# Patient Record
Sex: FEMALE | Race: OTHER | HISPANIC OR LATINO | URBAN - METROPOLITAN AREA
[De-identification: names, ages, dates, MRNs, and addresses within clinical notes are randomized per-mention and may not be internally consistent; named-entity substitution may affect disease eponyms.]

---
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
## 2021-04-15 ENCOUNTER — APPOINTMENT (OUTPATIENT)
Age: 21
End: 2021-04-15
Payer: MEDICAID

## 2021-04-15 PROCEDURE — 0001A: CPT

## 2021-05-06 ENCOUNTER — APPOINTMENT (OUTPATIENT)
Age: 21
End: 2021-05-06
Payer: MEDICAID

## 2021-05-06 PROCEDURE — 0002A: CPT

## 2021-07-01 ENCOUNTER — EMERGENCY (EMERGENCY)
Facility: HOSPITAL | Age: 21
LOS: 1 days | Discharge: HOME | End: 2021-07-01
Attending: EMERGENCY MEDICINE | Admitting: EMERGENCY MEDICINE
Payer: MEDICAID

## 2021-07-01 VITALS
HEIGHT: 62 IN | SYSTOLIC BLOOD PRESSURE: 105 MMHG | TEMPERATURE: 98 F | WEIGHT: 113.1 LBS | OXYGEN SATURATION: 100 % | RESPIRATION RATE: 16 BRPM | HEART RATE: 87 BPM | DIASTOLIC BLOOD PRESSURE: 64 MMHG

## 2021-07-01 VITALS
SYSTOLIC BLOOD PRESSURE: 110 MMHG | DIASTOLIC BLOOD PRESSURE: 65 MMHG | RESPIRATION RATE: 16 BRPM | OXYGEN SATURATION: 98 % | TEMPERATURE: 99 F | HEART RATE: 75 BPM

## 2021-07-01 PROCEDURE — 99283 EMERGENCY DEPT VISIT LOW MDM: CPT

## 2021-07-01 PROCEDURE — 99282 EMERGENCY DEPT VISIT SF MDM: CPT

## 2021-07-01 NOTE — ED PROVIDER NOTE - OBJECTIVE STATEMENT
20 yo female no pmhx, sexually active c/o vaginal itching on the lower outer part of her vagina since february 2021, gets worse after intercourse and after insert of tampon.  No medications used.  Feels itchy, not really painful.  No fever/chills.  Denies any abnormal discharge.  LMP 3 days ago.  Recently had STD panel performed that was negative.

## 2021-07-01 NOTE — ED PROVIDER NOTE - NSFOLLOWUPINSTRUCTIONS_ED_ALL_ED_FT
1) Follow-up with your GYN as scheduled Call today / next business day for prompt follow-up.  2) Return to Emergency room for any worsening or persistent pain, weakness, fever, or any other concerning symptoms.  3) See attached instruction sheets for additional information, including information regarding signs and symptoms to look out for, reasons to seek immediate care and other important instructions.  4) Bacitracin twice a day over affected area    A perineal tear is a cut or tear (laceration) in the tissue between the opening of the vagina and the anus (perineum). Some women develop a perineal tear during a vaginal birth. This can happen as the baby emerges from the birth canal and the perineum is stretched. There are four degrees of perineal tears based on how deep and long the laceration is:  •First degree. This involves a shallow tear at the edge of the vaginal opening that extends slightly into the perineal skin.      •Second degree. This involves tearing described in first degree perineal tear, and an additional deeper tear of the vaginal opening and perineal tissues. It may also include tearing of a muscle just under the perineal skin.      •Third degree. This involves tearing described in first and second degree perineal tears, with the addition that tearing in the third degree extends into the muscle of the anus (anal sphincter).      •Fourth degree. This involves all levels of tears described in first, second, and third degree perineal tears, with the tear in the fourth degree extending into the rectum.      First and second degree perineal tears may or may not be stitched closed, depending on their location and appearance. Third and fourth degree perineal tears are stitched closed immediately after the baby’s birth.      What are the risks?  Depending on the type of perineal tear you have, you may be at risk for:  •Bleeding.      •Developing a collection of blood in the perineal tear area (hematoma).      •Pain. This may include pain when you urinate, or pain when you have a bowel movement.      •Infection at the site of the tear.      •Fever.      •Trouble controlling your urination or bowels (incontinence).      •Painful sex.        How to care for a perineal tear    Wound care   •Take a sitz bath as told by your health care provider. A sitz bath is a warm water bath that is taken while you are sitting down. The water should only come up to your hips and should cover your buttocks. This can speed up healing.  1.Partially fill a bathtub with warm water. You will only need the water to be deep enough to cover your hips and buttocks when you are sitting in it.      2.If your health care provider told you to put medicine in the water, follow the directions exactly as told.      3.Sit in the water and open the tub drain a little.      4.Turn on the warm water again to keep the tub at the correct level. Keep the water running constantly.      5.Soak in the water for 15–20 minutes or as told by your health care provider.      6.After the sitz bath, pat the affected area dry first. Do not rub it.      7.Be careful when you stand up after the sitz bath because you may feel dizzy.        •Wash your hands before and after applying medicine to the area.      •Wear a sanitary pad as told by your health care provider. Change the pad as often as told by your health care provider.      •Leave stitches (sutures), skin glue, or adhesive strips in place. These skin closures may need to stay in place for 2 weeks or longer. If adhesive strip edges start to loosen and curl up, you may trim the loose edges. Do not remove adhesive strips completely unless your health care provider tells you to do that.    •Check your wound every day for signs of infection. Check for:  •Redness, swelling, or pain.      •Fluid or blood.      •Warmth.      •Pus or a bad smell.        Managing pain   •If directed, put ice on the painful area:  •Put ice in a plastic bag.      •Place a towel between your skin and the bag.      •Leave the ice on for 20 minutes, 2–3 times a day.        •Apply a numbing spray to the perineal tear site as told by your health care provider. This may help with discomfort.      •Take and apply over-the-counter and prescription medicines only as told by your health care provider.      •If told, put about 3 witch hazel-containing hemorrhoid treatment pads on top of your sanitary pad. The witch hazel in the hemorrhoid pads helps with swelling and discomfort.      •Sit on an inflatable ring or pillow. This may provide comfort.      General instructions     •Squeeze warm water on your perineum after urinating. This should be done from front to back with a squeeze bottle. Pat the area to dry it.      • Do not have sex, use tampons, or place anything in your vagina for at least 6 weeks or as told by your health care provider.      •Keep all follow-up visits as told by your health care provider. These include any postpartum visits. This is important.        Contact a health care provider if:    •Your pain is not relieved with medicines.      •You have painful urination.      •You have redness, swelling, or pain around your tear.      •You have fluid or blood coming from your tear.      •Your tear feels warm to the touch.      •You have pus or a bad smell coming from your tear.      •You have a fever.        Get help right away if:    •Your tear opens.      •You cannot urinate.      •You have an increase in bleeding.      •You have severe pain.        Summary    •A perineal tear is a cut or tear (laceration) in the tissue between the opening of the vagina and the anus (perineum).      •There are four degrees of perineal tears based on how deep and long the laceration is.      •First and second-degree perineal tears may or may not be stitched closed, depending on their location and appearance. Third and fourth- degree perineal tears are stitched closed immediately after the baby’s birth.      •Follow your health care provider's instructions for caring for your perineal tear. Know how to manage pain and how to care for your wound. Know when to call your health care provider and when to seek immediate emergency care.      This information is not intended to replace advice given to you by your health care provider. Make sure you discuss any questions you have with your health care provider.

## 2021-07-01 NOTE — ED PROVIDER NOTE - CLINICAL SUMMARY MEDICAL DECISION MAKING FREE TEXT BOX
small vaginal tear/irritation chronic, pt has f/u with gyn on 7/7, recommend bacitracin twice a day and to avoid intercourse/tampon use until f/u with gyn

## 2021-07-01 NOTE — ED ADULT NURSE NOTE - OBJECTIVE STATEMENT
Patient alert and oriented X 3. Complaining of vaginal pain and itching since yesterday. Patient LMP ended yesterday. Patient denies urinary symptoms. Patient states she had blood work for STi this week and has not been sexual active since.

## 2021-07-01 NOTE — ED PROVIDER NOTE - NS ED ROS FT
After Visit Summary   10/31/2018    Keshia Orellana    MRN: [id number]           Patient Information     Date Of Birth          2000        Visit Information        Provider Department      10/31/2018 11:40 AM Jenifer Grady APRN [facility]        Today's Diagnoses     Urticaria due to cold    -  1    Screen for STD (sexually transmitted disease)          Care Instructions      Understanding Hives (Urticaria)  Urticaria (hives) are red, itchy, and swollen areas on the skin. They are most often an allergic reaction from eating a food or taking a medicine. Sometimes the cause may be unknown. A single hive can vary in size from a half inch to several inches. Hives can appear all over the body. Or they may appear on only one part of the body.  What causes hives?  Hives can be caused by food and beverages such as:    Tree nuts (almonds, walnuts, hazelnuts)    Peanuts    Eggs    Shellfish    Milk  Hives can also be caused by medicines such as:    Antibiotics, especially penicillin and sulfa-based medicines     Anticonvulsant or antiseizure medicines     Chemotherapy medicines   Other causes of hives include:    Infection or virus    Heat    Cold air or cold water    Exercise    Scratching or rubbing your skin, or wearing tight-fitting clothes that rub your skin    Being exposed to sunlight or light from a light bulb, in rare cases    Inhaled-chemicals in the environment from foods and drugs, insects, plants, or other sources  In some cases, hives may occur again and again with no specific cause.  If you have hives    Stay away from the food, drink, medicine, or other thing that may be causing the hives.    Ask your healthcare provider how to control itchy or irritated skin.    Talk with your healthcare provider right away if you think a medicine gave you hives.  Watch for anaphylaxis  If you have hives, watch for symptoms of a severe reaction that can affect your entire body.  This is called anaphylaxis. Symptoms can include swollen areas of the body, wheezing, trouble breathing or swallowing, and a hoarse voice. This reaction may happen right away. Or it may happen in an hour or more. In extreme cases, the airways from mouth to lungs may swell and make breathing difficult. This is a medical emergency. Use epinephrine medicine if you have it, and call 911 or go to the emergency room.     When to call your healthcare provider  Call your healthcare provider if:    Your hives feel uncomfortable    You have never had hives before    Your symptoms don't go away or come back    Your symptoms get worse or new symptoms develop such as:   ? Sneezing, coughing, runny or stuffy nose  ? Itching of the eyes, nose, or roof of the mouth  ? Itching, burning, stinging, or pain  ? Dry, flaky, cracking, or scaly skin  ? Red or purple spots  Call 911  Call 911 right away if you have:    Swelling in your lips, tongue, or throat, called angioedema    Drooling    Trouble breathing, talking, or swallowing    Cool, moist or pale (blue in color) skin    Fast and weak heartbeat    Wheezing or short of breath    Feeling lightheaded or confused    Diarrhea    Severe nausea or vomiting    Seizure    Feeling dizzy or weak, or a sudden drop in blood pressure   Date Last Reviewed: 4/1/2017 2000-2017 [employer]. [address] [place] [address]. All rights reserved. This information is not intended as a substitute for professional medical care. Always follow your healthcare professional's instructions.      [facility]    If you have any questions regarding to your visit please contact your care team:       Team Red:   Clinic Hours Telephone Number   Dr. Kiera Grady NP 7am-7pm  Monday - Thursday   7am-5pm  Fridays  [phone]  (Appointment scheduling available 24/7)   Urgent Care - [place] -  11am-9pm Monday-Friday Saturday-Sunday- 9am-5pm   [place] - 5pm-9pm Monday-Friday Saturday-Sunday- 9am-5pm  [phone] - [place]  [phone] - [place]       What options do I have for a visit other than an office visit? We offer electronic visits (e-visits) and telephone visits, when medically appropriate.  Please check with your medical insurance to see if these types of visits are covered, as you will be responsible for any charges that are not paid by your insurance.      You can use "[employer] (secure electronic communication) to access to your chart, send your primary care provider a message, or make an appointment. Ask a team member how to get started.     For a price quote for your services, please call our Consumer Price Line at [phone] or our Imaging Cost estimation line at [phone] (for imaging tests).    Discharged by Zahra Ivory MA.            Follow-ups after your visit        Who to contact     If you have questions or need follow up information about today's clinic visit or your schedule please contact [facility] directly at [phone].  Normal or non-critical lab and imaging results will be communicated to you by WISHIhart, letter or phone within 4 business days after the clinic has received the results. If you do not hear from us within 7 days, please contact the clinic through DECAt or phone. If you have a critical or abnormal lab result, we will notify you by phone as soon as possible.  Submit refill requests through "[employer] or call your pharmacy and they will forward the refill request to us. Please allow 3 business days for your refill to be completed.          Additional Information About Your Visit        "[employer] Information     "[employer] lets you send messages to your doctor, view your test results, renew your prescriptions, schedule appointments and more. To sign up, go to [email or web address] contact your [facility] clinic or call [phone] during  "business hours.            Care EveryWhere ID     This is your Care EveryWhere ID. This could be used by other organizations to access your [facility] medical records  [id number]        Your Vitals Were     Pulse Temperature Height Pulse Oximetry BMI (Body Mass Index)       77 97.4  F (36.3  C) (Oral) 5' 5.5\" (1.664 m) 100% 25.4 kg/m2        Blood Pressure from Last 3 Encounters:   10/31/18 106/62   05/18/18 98/52   04/12/17 111/68    Weight from Last 3 Encounters:   10/31/18 155 lb (70.3 kg) (88 %)*   05/18/18 154 lb (69.9 kg) (88 %)*   04/12/17 157 lb 9.6 oz (71.5 kg) (91 %)*     * Growth percentiles are based on [facility] 2-20 Years data.              We Performed the Following     CHLAMYDIA TRACHOMATIS PCR     NEISSERIA GONORRHOEA PCR          Today's Medication Changes          These changes are accurate as of 10/31/18 12:08 PM.  If you have any questions, ask your nurse or doctor.               Start taking these medicines.        Dose/Directions    cetirizine 10 MG tablet   Commonly known as:  zyrTEC   Used for:  Urticaria due to cold   Started by:  Jenifer Grady APRN CNP        Dose:  10 mg   Take 1 tablet (10 mg) by mouth every evening   Quantity:  90 tablet   Refills:  3       EPINEPHrine 0.3 MG/0.3ML injection 2-pack   Commonly known as:  EPIPEN/ADRENACLICK/or ANY BX GENERIC EQUIV   Used for:  Urticaria due to cold   Started by:  Jenifer Grady APRN CNP        Dose:  0.3 mg   Inject 0.3 mLs (0.3 mg) into the muscle as needed for anaphylaxis   Quantity:  0.6 mL   Refills:  3            Where to get your medicines      These medications were sent to [facility] - [address]  [address] [address]     Phone:  [phone]     cetirizine 10 MG tablet    EPINEPHrine 0.3 MG/0.3ML injection 2-pack                Primary Care Provider Office Phone # Fax #    ERON Brush [phone]       [phone] [facility] [address]        Equal Access to Services     YUMIKO HAIDER : Hadii aad ku hadopalana Fuentes, wabranda zully, dilanamy sonwildaadal coxbeatriceadal, venkat oatesidaniayves douglas. So [facility] [phone].    ATENCIÓN: Si habla español, tiene a norman disposición servicios gratuitos de asistencia lingüística. [facility] [phone].    We comply with applicable federal civil rights laws and [place] laws. We do not discriminate on the basis of race, color, national origin, age, disability, sex, sexual orientation, or gender identity.            Thank you!     Thank you for choosing [facility]  for your care. Our goal is always to provide you with excellent care. Hearing back from our patients is one way we can continue to improve our services. Please take a few minutes to complete the written survey that you may receive in the mail after your visit with us. Thank you!             Your Updated Medication List - Protect others around you: Learn how to safely use, store and throw away your medicines at www.disposemymeds.org.          This list is accurate as of 10/31/18 12:08 PM.  Always use your most recent med list.                   Brand Name Dispense Instructions for use Diagnosis    cetirizine 10 MG tablet    zyrTEC    90 tablet    Take 1 tablet (10 mg) by mouth every evening    Urticaria due to cold       EPINEPHrine 0.3 MG/0.3ML injection 2-pack    EPIPEN/ADRENACLICK/or ANY BX GENERIC EQUIV    0.6 mL    Inject 0.3 mLs (0.3 mg) into the muscle as needed for anaphylaxis    Urticaria due to cold       etonogestrel 68 MG Impl    NEXPLANON    1 each    One device, subdermally, for up to 3 years    Nexplanon insertion          Constitutional: - Fever, - Chills, - Anorexia, - Fatigue, - Night sweats  Eyes: - Discharge, - Irritation, - Redness, - Visual changes, - Light sensitivity, - Pain  EARS: - Ear Pain, - Tinnitus, - Decreased hearing  NOSE: - Congestion, - Bloody nose  MOUTH/THROAT: - Vocal Changes, - Drooling, - Sore throat  NECK: - Lumps, - Stiffness, - Pain  CV: - Palpitations, - Chest Pain, - Edema, - Syncope  RESP:  - Cough, - Shortness of Breath, - Dyspnea on Exertion, - Trouble speaking, - Pleuritic pain - Wheezing  GI: - Diarrhea, - Constipation, - Bloody stools, - Nausea, - Vomiting, - Abdominal Pain  : - Dysuria, -Frequency, - Hematuria, - Hesitancy, - Incontinence, - Saddle Anesthesia, - Abnormal discharge  MSK: - Myalgias, - Arthralgias, - Weakness, - Deformities, - Injuries  SKIN: - Color change, - Rash, - Swelling, - Ecchymosis, - Abrasion, - laceration  NEURO: - Change in behavior, - Dec. Alertness, - Headache, - Dizziness, - Change in speech, - Weakness, - Seizure-like activity, - Difficulty ambulating

## 2021-07-01 NOTE — ED PROVIDER NOTE - PHYSICAL EXAMINATION
Gen: Alert, NAD  Head/eyes: NC/AT, PERRL  ENT: airway patent  GI/Abd: soft, NT/ND, +BS, no guarding/rebound tenderness  : normal genitalia, +small superficial tear/fissure along lower outer vagina, 1cm vertical, no discharge, no lesions/vesicles  Musculoskeletal: no edema/erythema/cyanosis, FROM in all extremities, no C/T/L spine ttp  Skin: no rash, no vesicles, no petechaie, no ecchymosis, no swelling

## 2021-09-22 ENCOUNTER — TRANSCRIPTION ENCOUNTER (OUTPATIENT)
Age: 21
End: 2021-09-22

## 2021-10-20 ENCOUNTER — TRANSCRIPTION ENCOUNTER (OUTPATIENT)
Age: 21
End: 2021-10-20

## 2021-10-21 ENCOUNTER — TRANSCRIPTION ENCOUNTER (OUTPATIENT)
Age: 21
End: 2021-10-21

## 2021-10-29 ENCOUNTER — EMERGENCY (EMERGENCY)
Facility: HOSPITAL | Age: 21
LOS: 1 days | Discharge: HOME | End: 2021-10-29
Attending: INTERNAL MEDICINE | Admitting: INTERNAL MEDICINE
Payer: MEDICAID

## 2021-10-29 VITALS
HEART RATE: 984 BPM | SYSTOLIC BLOOD PRESSURE: 112 MMHG | DIASTOLIC BLOOD PRESSURE: 76 MMHG | OXYGEN SATURATION: 96 % | RESPIRATION RATE: 18 BRPM | TEMPERATURE: 98 F

## 2021-10-29 VITALS
RESPIRATION RATE: 18 BRPM | TEMPERATURE: 98 F | SYSTOLIC BLOOD PRESSURE: 110 MMHG | HEIGHT: 62 IN | OXYGEN SATURATION: 95 % | DIASTOLIC BLOOD PRESSURE: 71 MMHG | HEART RATE: 106 BPM | WEIGHT: 113.1 LBS

## 2021-10-29 PROCEDURE — 99283 EMERGENCY DEPT VISIT LOW MDM: CPT

## 2021-10-29 RX ORDER — IRON,CARBONYL 45 MG
1 TABLET ORAL
Qty: 0 | Refills: 0 | DISCHARGE

## 2021-10-29 RX ADMIN — Medication 1 TABLET(S): at 05:44

## 2021-10-29 NOTE — ED PROVIDER NOTE - OBJECTIVE STATEMENT
sore throat cough 1-2 weeks mucous production covid neg one week ago, received covid 19 vaccine 20 y/o female with sore throat cough 1-2 weeks mucous production covid neg one week ago, received covid 19 vaccine, she was at the  for the same, no treatment due to suspected viral. Now with increased discomfort and cervical glandular tenderness.

## 2021-10-29 NOTE — ED PROVIDER NOTE - PATIENT PORTAL LINK FT
You can access the FollowMyHealth Patient Portal offered by [facility] by registering at the following website: [email or web address]. By joining [employer]’s FollowMyHealth portal, you will also be able to view your health information using other applications (apps) compatible with our system.

## 2021-10-29 NOTE — ED PROVIDER NOTE - CLINICAL SUMMARY MEDICAL DECISION MAKING FREE TEXT BOX
uri symptoms with increasing throat pain, glandular discomfort, cough with mucous production will Rx Augment and DC to O/P follow up

## 2021-10-29 NOTE — ED PROVIDER NOTE - ENMT, MLM
Airway patent, Nasal mucosa clear. Mouth with normal mucosa. Throat has hyperemia , no oropharyngeal exudates and uvula is midline.

## 2021-10-29 NOTE — ED PROVIDER NOTE - NSFOLLOWUPCLINICS_GEN_ALL_ED_FT
[facility]  Urgent Care  [address] [place] [id number]  Phone: [phone]  Fax: [phone]

## 2021-10-29 NOTE — ED ADULT NURSE NOTE - OBJECTIVE STATEMENT
21 yr old female c/o sore throat without fever x 1 week, worse at night. Denies fever, [place], sick contacts.

## 2021-12-06 NOTE — ED ADULT NURSE NOTE - ILLNESS RECENT EXPOSURE
Last appointment: 3/15/21  Future appointment: not booked  Labs: 9/15/21  Refill request:  levothyroxine 75 MCG tablet: Take 1 tablet by mouth daily.     Patient overdue for labs.  Ath[employer] message sent   None known

## 2023-11-27 ENCOUNTER — NON-APPOINTMENT (OUTPATIENT)
Age: 23
End: 2023-11-27

## 2023-11-29 ENCOUNTER — NON-APPOINTMENT (OUTPATIENT)
Age: 23
End: 2023-11-29

## 2024-08-27 PROBLEM — Z00.00 ENCOUNTER FOR PREVENTIVE HEALTH EXAMINATION: Status: ACTIVE | Noted: 2024-08-27

## 2025-01-15 ENCOUNTER — NON-APPOINTMENT (OUTPATIENT)
Age: 25
End: 2025-01-15

## 2025-04-25 NOTE — ED PEDIATRIC NURSE NOTE - NS ED NURSE LEVEL OF CONSCIOUSNESS ORIENTATION
Department of Anesthesiology  Preprocedure Note       Name:  Florecita Hurt   Age:  66 y.o.  :  1958                                          MRN:  [id number]         Date:  2025      Surgeon: Surgeon(s):  Daiana Browne DO    Procedure: Procedure(s):  RIGHT BREAST LOCAOLIZED EXCISIONAL BIOPSY    Medications prior to admission:   Prior to Admission medications    Medication Sig Start Date End Date Taking? Authorizing Provider   atorvastatin (LIPITOR) 10 MG tablet Take 1 tablet by mouth daily 7/3/23  Yes ProviderSalima MD   amLODIPine (NORVASC) 5 MG tablet Take 1 tablet by mouth daily 23  Yes ProviderSalima MD   FARXIGA 5 MG tablet Take 1 tablet by mouth every morning 23  Yes Provider Historical, MD   losartan (COZAAR) 100 MG tablet Take 1 tablet by mouth daily 23  Yes ProviderSalima MD   ergocalciferol (ERGOCALCIFEROL) 1.25 MG (89342 UT) capsule Take 1 capsule by mouth every 7 days Every Friday 8/31/15  Yes Automatic Reconciliation, Ar   hydroCHLOROthiazide (HYDRODIURIL) 25 MG tablet Take 1 tablet by mouth daily (In the afternoon) 13  Yes Automatic Reconciliation, Ar   meloxicam (MOBIC) 15 MG tablet Take 1 tablet by mouth daily ceived the following from [facility]: Outside name: meloxicam (MOBIC) 15 mg tablet 9/14/15  Yes Automatic Reconciliation, Ar       Current medications:    Current Facility-Administered Medications   Medication Dose Route Frequency Provider Last Rate Last Admin   • sodium chloride flush 0.9 % injection 5-40 mL  5-40 mL IntraVENous 2 times per day Yesdexterokin, Daiana, DO       • sodium chloride flush 0.9 % injection 5-40 mL  5-40 mL IntraVENous PRN Yesroseannain, Daiana, DO       • 0.9 % sodium chloride infusion   IntraVENous PRN Yesdexterokin, Daiana, DO       • ceFAZolin (ANCEF) 3,000 mg in sterile water 30 mL IV Syringe  3,000 mg IntraVENous On Call to OR Yesscooter, Daiana, DO       • lidocaine PF 1 % injection 1 mL  1 mL 
Oriented - self; Oriented - place; Oriented - time